# Patient Record
Sex: FEMALE | Race: OTHER | HISPANIC OR LATINO | URBAN - METROPOLITAN AREA
[De-identification: names, ages, dates, MRNs, and addresses within clinical notes are randomized per-mention and may not be internally consistent; named-entity substitution may affect disease eponyms.]

---

## 2018-06-07 ENCOUNTER — OFFICE VISIT (OUTPATIENT)
Dept: OBGYN CLINIC | Facility: CLINIC | Age: 23
End: 2018-06-07
Payer: COMMERCIAL

## 2018-06-07 VITALS
HEIGHT: 63 IN | SYSTOLIC BLOOD PRESSURE: 120 MMHG | BODY MASS INDEX: 29.77 KG/M2 | DIASTOLIC BLOOD PRESSURE: 78 MMHG | WEIGHT: 168 LBS

## 2018-06-07 DIAGNOSIS — N92.6 IRREGULAR BLEEDING: Primary | ICD-10-CM

## 2018-06-07 DIAGNOSIS — N93.0 POSTCOITAL BLEEDING: ICD-10-CM

## 2018-06-07 PROCEDURE — 99202 OFFICE O/P NEW SF 15 MIN: CPT | Performed by: PHYSICIAN ASSISTANT

## 2018-06-07 PROCEDURE — 87591 N.GONORRHOEAE DNA AMP PROB: CPT | Performed by: PHYSICIAN ASSISTANT

## 2018-06-07 PROCEDURE — G0145 SCR C/V CYTO,THINLAYER,RESCR: HCPCS | Performed by: PHYSICIAN ASSISTANT

## 2018-06-07 PROCEDURE — 87491 CHLMYD TRACH DNA AMP PROBE: CPT | Performed by: PHYSICIAN ASSISTANT

## 2018-06-07 RX ORDER — NORGESTREL-ETHINYL ESTRADIOL 0.3-0.03MG
TABLET ORAL
COMMUNITY
Start: 2018-04-02 | End: 2018-06-08 | Stop reason: SDUPTHER

## 2018-06-07 NOTE — PROGRESS NOTES
Lomarizol Freitas  1995    S:  25 y o  female here for a problem visit  She reports irregular menses  She says that in the past she had a very heavy, long bleed requiring a D&C  At that time she was placed on continuous Cryselle for control of heavy bleeding  She has done well until the past few months and she is now having irregular bleeding again  It can range from spotting to heavy bleeding  She is taking her pills every day at the same time  She also notes vaginal bleeding with intercourse or masturbation, only with an orgasm, and with or without penetration  She has no pain with this  This has occurred over the past year  She is not sexually active for the past year  She recalls being tested when she was young because she had excessive bleeding and had trouble stopping bleeding if she had a cut, etc , and does not recall any diagnosis being reached  Her mother had a hysterectomy for menorrhagia in her 35s      Past Medical History:   Diagnosis Date    Depression     Migraine     Sleep difficulties      Family History   Problem Relation Age of Onset    Depression Mother    Vena Demarco Fibromyalgia Mother     Arthritis Mother     Diabetes Father     Autoimmune disease Brother     Prostate cancer Maternal Grandfather     Diabetes Paternal Grandmother     Diabetes Paternal Grandfather     Skin cancer Paternal Grandfather      Social History     Social History    Marital status: Single     Spouse name: N/A    Number of children: N/A    Years of education: N/A     Social History Main Topics    Smoking status: Never Smoker    Smokeless tobacco: Never Used    Alcohol use Yes      Comment: socially     Drug use: No    Sexual activity: Not Currently     Partners: Male     Birth control/ protection: OCP     Other Topics Concern    None     Social History Narrative    None       O:  /78 (BP Location: Right arm, Patient Position: Sitting, Cuff Size: Standard)   Ht 5' 2 99" (1 6 m)   Wt 76 2 kg (168 lb)   LMP  (LMP Unknown)   BMI 29 77 kg/m²   She appears well and is in no distress  Abdomen is soft and nontender  External genitals are normal without lesions or rashes  Vagina is normal, no discharge or bleeding noted  Cervix is normal, no lesions or discharge, not friable  Uterus is nontender, no masses  Adnexa are nontender, no pelvic masses appreciated    A/P:  Irregular bleeding  Postcoital bleeding - question if this is truly postcoital or if it is bleeding due to her irregular bleeding and having an orgasm    Will check Pap, GC/chlamydia, CBC, TSH, vonWillebrand profile  Patient considering switch to [facility] or [facility]; she was sent with information regarding both of these

## 2018-06-08 DIAGNOSIS — Z01.419 ENCOUNTER FOR GYNECOLOGICAL EXAMINATION WITHOUT ABNORMAL FINDING: Primary | ICD-10-CM

## 2018-06-08 LAB
CHLAMYDIA DNA CVX QL NAA+PROBE: NORMAL
N GONORRHOEA DNA GENITAL QL NAA+PROBE: NORMAL

## 2018-06-08 RX ORDER — NORGESTREL-ETHINYL ESTRADIOL 0.3-0.03MG
1 TABLET ORAL DAILY
Qty: 28 TABLET | Refills: 0 | Status: SHIPPED | OUTPATIENT
Start: 2018-06-08 | End: 2018-06-28 | Stop reason: SDUPTHER

## 2018-06-08 NOTE — TELEPHONE ENCOUNTER
Pt was in yesterday with Irving Duncan, needs her [facility] [place]   Pt said just 1 pack

## 2018-06-12 ENCOUNTER — TELEPHONE (OUTPATIENT)
Dept: OBGYN CLINIC | Facility: CLINIC | Age: 23
End: 2018-06-12

## 2018-06-12 LAB
LAB AP GYN PRIMARY INTERPRETATION: NORMAL
Lab: NORMAL

## 2018-06-12 NOTE — TELEPHONE ENCOUNTER
Mirena paperwork faxed to [facility] on 6/12/2018, please check Mirena status in one week  Thank you

## 2018-06-14 ENCOUNTER — TELEPHONE (OUTPATIENT)
Dept: OBGYN CLINIC | Facility: CLINIC | Age: 23
End: 2018-06-14

## 2018-06-14 LAB
APTT PPP: 32 SEC (ref 22–34)
BASOPHILS # BLD AUTO: 50 CELLS/UL (ref 0–200)
BASOPHILS NFR BLD AUTO: 0.6 %
COAG FACT INTRINSIC PPP-IMP: NORMAL
EOSINOPHIL # BLD AUTO: 58 CELLS/UL (ref 15–500)
EOSINOPHIL NFR BLD AUTO: 0.7 %
ERYTHROCYTE [DISTWIDTH] IN BLOOD BY AUTOMATED COUNT: 13.4 % (ref 11–15)
FACT XIIIA PPP-ACNC: 91 % (ref 50–180)
HCT VFR BLD AUTO: 38.8 % (ref 35–45)
HGB BLD-MCNC: 12.9 G/DL (ref 11.7–15.5)
LYMPHOCYTES # BLD AUTO: 2498 CELLS/UL (ref 850–3900)
LYMPHOCYTES NFR BLD AUTO: 30.1 %
MCH RBC QN AUTO: 28.7 PG (ref 27–33)
MCHC RBC AUTO-ENTMCNC: 33.2 G/DL (ref 32–36)
MCV RBC AUTO: 86.2 FL (ref 80–100)
MONOCYTES # BLD AUTO: 490 CELLS/UL (ref 200–950)
MONOCYTES NFR BLD AUTO: 5.9 %
NEUTROPHILS # BLD AUTO: 5204 CELLS/UL (ref 1500–7800)
NEUTROPHILS NFR BLD AUTO: 62.7 %
PLATELET # BLD AUTO: 401 THOUSAND/UL (ref 140–400)
PMV BLD REES-ECKER: 9.8 FL (ref 7.5–12.5)
RBC # BLD AUTO: 4.5 MILLION/UL (ref 3.8–5.1)
TSH SERPL-ACNC: 1.24 MIU/L
VWF AG ACT/NOR PPP IA: 80 % (ref 50–217)
VWF MULTIMERS PPP QL: NORMAL
VWF:RCO ACT/NOR PPP PL AGG: 56 % (ref 42–200)
WBC # BLD AUTO: 8.3 THOUSAND/UL (ref 3.8–10.8)

## 2018-06-14 NOTE — TELEPHONE ENCOUNTER
----- Message from Marie Ladd PA-C sent at 6/14/2018  3:16 PM EDT -----  Please let Khushi Hammond know that all of her blood tests are normal - great news!

## 2018-06-14 NOTE — TELEPHONE ENCOUNTER
I spent a long time on phone with pt  She discussed her irreg bleeding and how it is affecting her emotionally  Pt wants [facility] to call her to discuss  I tried to tell pt - mirena will take about 1 mo to get and insert - pt wants to speak with [facility] now  Please call her     I originally called her to tell her labs ok

## 2018-06-15 NOTE — TELEPHONE ENCOUNTER
Spoke with pt - she is aware of the process and where we are with it - she stated she also spoke with [facility] - is ok for now

## 2018-06-15 NOTE — TELEPHONE ENCOUNTER
Patient stated she handed the IUD paperwork into the [place] office on Monday  Exaplained the process of how long it would be for us to get the IUD  Still would like to have [facility] to call her to discuss her bleeding

## 2018-06-15 NOTE — TELEPHONE ENCOUNTER
I left her a message that all her results are normal and that if she is interested in an IUD to get the paperwork back to us so that we can order it for her

## 2018-06-15 NOTE — TELEPHONE ENCOUNTER
I called her again and left a message telling her she should let the nurses know what questions she has so that I can help her, since I am with patients

## 2018-06-21 NOTE — TELEPHONE ENCOUNTER
Called [facility] to check iud status, per Thais Campos @ [facility] will arrive to our office on 6/26/2018

## 2018-06-28 DIAGNOSIS — IMO0001 BIRTH CONTROL: Primary | ICD-10-CM

## 2018-06-28 DIAGNOSIS — Z01.419 ENCOUNTER FOR GYNECOLOGICAL EXAMINATION WITHOUT ABNORMAL FINDING: ICD-10-CM

## 2018-06-28 RX ORDER — NORGESTREL-ETHINYL ESTRADIOL 0.3-0.03MG
1 TABLET ORAL DAILY
Qty: 28 TABLET | Refills: 0 | Status: SHIPPED | OUTPATIENT
Start: 2018-06-28 | End: 2018-07-03 | Stop reason: ALTCHOICE

## 2018-06-28 NOTE — TELEPHONE ENCOUNTER
Pt needs refill of bc cryselle, until she makes her appt for Mirena insertion, told pt mirena is here & she will call when she gets her cycle,  [facility]s [facility] [place] [facility],  prov is [facility],  thanks

## 2018-06-28 NOTE — TELEPHONE ENCOUNTER
Mirena received from [facility]  Will be placed in the [facility] room pending insertion  Thank you!

## 2018-07-03 ENCOUNTER — OFFICE VISIT (OUTPATIENT)
Dept: OBGYN CLINIC | Facility: CLINIC | Age: 23
End: 2018-07-03
Payer: COMMERCIAL

## 2018-07-03 VITALS — SYSTOLIC BLOOD PRESSURE: 124 MMHG | DIASTOLIC BLOOD PRESSURE: 78 MMHG | WEIGHT: 168 LBS | BODY MASS INDEX: 29.77 KG/M2

## 2018-07-03 DIAGNOSIS — Z30.430 ENCOUNTER FOR INSERTION OF MIRENA IUD: Primary | ICD-10-CM

## 2018-07-03 PROCEDURE — 58300 INSERT INTRAUTERINE DEVICE: CPT | Performed by: PHYSICIAN ASSISTANT

## 2018-08-09 ENCOUNTER — ANNUAL EXAM (OUTPATIENT)
Dept: OBGYN CLINIC | Facility: CLINIC | Age: 23
End: 2018-08-09
Payer: COMMERCIAL

## 2018-08-09 VITALS
DIASTOLIC BLOOD PRESSURE: 58 MMHG | WEIGHT: 171.4 LBS | BODY MASS INDEX: 30.37 KG/M2 | SYSTOLIC BLOOD PRESSURE: 106 MMHG | HEIGHT: 63 IN

## 2018-08-09 DIAGNOSIS — Z11.3 SCREENING FOR STDS (SEXUALLY TRANSMITTED DISEASES): Primary | ICD-10-CM

## 2018-08-09 PROCEDURE — 99395 PREV VISIT EST AGE 18-39: CPT | Performed by: PHYSICIAN ASSISTANT

## 2018-08-09 PROCEDURE — 87491 CHLMYD TRACH DNA AMP PROBE: CPT | Performed by: PHYSICIAN ASSISTANT

## 2018-08-09 PROCEDURE — 87591 N.GONORRHOEAE DNA AMP PROB: CPT | Performed by: PHYSICIAN ASSISTANT

## 2018-08-09 NOTE — PROGRESS NOTES
Richard Cowan  1995    CC:  Yearly exam    S:  25 y o  female here for yearly exam  Her cycles are regular, not heavy or crampy  She is sexually active with a newer partner  She uses Mirena for contraception; this was placed on 7/3  She has been amenorrheic since and she is very happy with this  She does request STD testing today  Last Pap 2018 neg      Current Outpatient Prescriptions:     MIRENA, 52 MG, 20 MCG/24HR IUD, , Disp: , Rfl:     sertraline (ZOLOFT) 50 mg tablet, , Disp: , Rfl:   Social History     Social History    Marital status: Single     Spouse name: N/A    Number of children: N/A    Years of education: N/A     Occupational History    Not on file  Social History Main Topics    Smoking status: Never Smoker    Smokeless tobacco: Never Used    Alcohol use Yes      Comment: socially     Drug use: No    Sexual activity: Yes     Partners: Male     Birth control/ protection: IUD     Other Topics Concern    Not on file     Social History Narrative    Mirena IUD inserted 07/03/2018     Family History   Problem Relation Age of Onset    Depression Mother     Fibromyalgia Mother     Arthritis Mother     Diabetes Father     Autoimmune disease Brother     Prostate cancer Maternal Grandfather     Diabetes Paternal Grandmother     Diabetes Paternal Grandfather     Skin cancer Paternal Grandfather      Past Medical History:   Diagnosis Date    Depression     Migraine     Sleep difficulties          O:  Blood pressure 106/58, height 5' 3" (1 6 m), weight 77 7 kg (171 lb 6 4 oz)  Patient appears well and is not in distress  Neck is supple without masses  Breasts are symmetrical without mass, tenderness, nipple discharge, skin changes or adenopathy  Abdomen is soft and nontender without masses  External genitals are normal without lesions or rashes  Vagina is normal without discharge or bleeding  Cervix is normal without discharge or lesion     Uterus is normal, mobile, nontender without palpable mass  Adnexa are normal, nontender, without palpable mass  A:  Yearly exam      P:   GC/chlamydia sent   Pap 2021   RTO one year for yearly exam or sooner as needed

## 2018-08-12 LAB
CHLAMYDIA DNA CVX QL NAA+PROBE: NORMAL
N GONORRHOEA DNA GENITAL QL NAA+PROBE: NORMAL

## 2018-08-29 ENCOUNTER — TELEPHONE (OUTPATIENT)
Dept: OBGYN CLINIC | Facility: CLINIC | Age: 23
End: 2018-08-29

## 2018-08-29 NOTE — TELEPHONE ENCOUNTER
Pt was at urgent care Mon - said she had yeast  Gave her 2 fluconazole  Called same urgent care today - uti sx - they gave her bactrim  Pt wanted to know if ABIC would affect iud   I told her no problem taking bactrim with iud  They did not do nor order u/a

## 2018-08-31 ENCOUNTER — TELEPHONE (OUTPATIENT)
Dept: OBGYN CLINIC | Facility: CLINIC | Age: 23
End: 2018-08-31

## 2018-08-31 ENCOUNTER — APPOINTMENT (OUTPATIENT)
Dept: LAB | Age: 23
End: 2018-08-31
Payer: COMMERCIAL

## 2018-08-31 ENCOUNTER — TRANSCRIBE ORDERS (OUTPATIENT)
Dept: ADMINISTRATIVE | Age: 23
End: 2018-08-31

## 2018-08-31 DIAGNOSIS — N39.0 URINARY TRACT INFECTION WITHOUT HEMATURIA, SITE UNSPECIFIED: ICD-10-CM

## 2018-08-31 DIAGNOSIS — N39.0 URINARY TRACT INFECTION WITHOUT HEMATURIA, SITE UNSPECIFIED: Primary | ICD-10-CM

## 2018-08-31 LAB
BACTERIA UR QL AUTO: ABNORMAL /HPF
BILIRUB UR QL STRIP: NEGATIVE
CLARITY UR: ABNORMAL
COLOR UR: YELLOW
GLUCOSE UR STRIP-MCNC: NEGATIVE MG/DL
HGB UR QL STRIP.AUTO: NEGATIVE
HYALINE CASTS #/AREA URNS LPF: ABNORMAL /LPF
KETONES UR STRIP-MCNC: NEGATIVE MG/DL
LEUKOCYTE ESTERASE UR QL STRIP: ABNORMAL
NITRITE UR QL STRIP: NEGATIVE
NON-SQ EPI CELLS URNS QL MICRO: ABNORMAL /HPF
PH UR STRIP.AUTO: 6.5 [PH] (ref 4.5–8)
PROT UR STRIP-MCNC: NEGATIVE MG/DL
RBC #/AREA URNS AUTO: ABNORMAL /HPF
SP GR UR STRIP.AUTO: 1.01 (ref 1–1.03)
UROBILINOGEN UR QL STRIP.AUTO: 0.2 E.U./DL
WBC #/AREA URNS AUTO: ABNORMAL /HPF

## 2018-08-31 PROCEDURE — 81001 URINALYSIS AUTO W/SCOPE: CPT | Performed by: PHYSICIAN ASSISTANT

## 2018-08-31 PROCEDURE — 87086 URINE CULTURE/COLONY COUNT: CPT

## 2018-08-31 NOTE — TELEPHONE ENCOUNTER
Patient reports having vaginal itching and trouble urinating  Patient was seen at the Urgent care twice  The first time they gave her diflucan for a suspected yeast infection but that did not work  The second time they gave her bactrim for an UTI but that did not relieve her symptoms either  Patient is still in pain but we did not have an appointment available until next week  Please advise

## 2018-08-31 NOTE — TELEPHONE ENCOUNTER
I spoke with patient, she states she has burning and irritation when she urinates, she has been on Bactrim for 3 days given to her by the urgent care  She is not better so she went back to the urgent care today and they gave her a different antibiotic which she did not know the name of, it was in her car  She was a the lab giving her urine specimen  I told her to follow the instructions given to her by urgent care and to call if not better

## 2018-09-01 LAB — BACTERIA UR CULT: NORMAL

## 2018-09-04 ENCOUNTER — TELEPHONE (OUTPATIENT)
Dept: OBGYN CLINIC | Facility: CLINIC | Age: 23
End: 2018-09-04

## 2018-09-04 NOTE — TELEPHONE ENCOUNTER
Patient states she went to urgent care, treated for yeast infection with fluconazole, did not help  Then treated for UTI with Bactrim and cipro, did not work  Her symptoms are internal vaginal itching and burning  No discharge, slight foul odor  She was called by the urgent care and told she did not have a UTI  Please advise if she needs an appointment   She is aware that this will not be addressed until tomorrow

## 2018-09-06 ENCOUNTER — OFFICE VISIT (OUTPATIENT)
Dept: OBGYN CLINIC | Facility: CLINIC | Age: 23
End: 2018-09-06
Payer: COMMERCIAL

## 2018-09-06 VITALS — WEIGHT: 178 LBS | BODY MASS INDEX: 31.53 KG/M2 | DIASTOLIC BLOOD PRESSURE: 64 MMHG | SYSTOLIC BLOOD PRESSURE: 102 MMHG

## 2018-09-06 DIAGNOSIS — N89.8 VAGINAL IRRITATION: ICD-10-CM

## 2018-09-06 DIAGNOSIS — R10.2 PELVIC PAIN: Primary | ICD-10-CM

## 2018-09-06 PROCEDURE — 87510 GARDNER VAG DNA DIR PROBE: CPT | Performed by: PHYSICIAN ASSISTANT

## 2018-09-06 PROCEDURE — 87660 TRICHOMONAS VAGIN DIR PROBE: CPT | Performed by: PHYSICIAN ASSISTANT

## 2018-09-06 PROCEDURE — 99214 OFFICE O/P EST MOD 30 MIN: CPT | Performed by: PHYSICIAN ASSISTANT

## 2018-09-06 PROCEDURE — 87480 CANDIDA DNA DIR PROBE: CPT | Performed by: PHYSICIAN ASSISTANT

## 2018-09-06 RX ORDER — METRONIDAZOLE 500 MG/1
500 TABLET ORAL EVERY 8 HOURS SCHEDULED
Qty: 42 TABLET | Refills: 0 | Status: SHIPPED | OUTPATIENT
Start: 2018-09-06 | End: 2018-09-21 | Stop reason: ALTCHOICE

## 2018-09-06 RX ORDER — DOXYCYCLINE 100 MG/1
100 TABLET ORAL 2 TIMES DAILY
Qty: 28 TABLET | Refills: 0 | Status: SHIPPED | OUTPATIENT
Start: 2018-09-06 | End: 2018-09-20

## 2018-09-08 LAB
CANDIDA RRNA VAG QL PROBE: NEGATIVE
G VAGINALIS RRNA GENITAL QL PROBE: NEGATIVE
T VAGINALIS RRNA GENITAL QL PROBE: NEGATIVE

## 2018-09-14 ENCOUNTER — TELEPHONE (OUTPATIENT)
Dept: OBGYN CLINIC | Facility: CLINIC | Age: 23
End: 2018-09-14

## 2018-09-14 NOTE — TELEPHONE ENCOUNTER
Pt started Doxy and Flagyl on Sunday, she feels nauseous on it  She feels a little better with her vaginal itching but it does get worse at times  Please advise and [facility] [place]

## 2018-09-14 NOTE — TELEPHONE ENCOUNTER
Left voicemail message today @ [phone] per Pt's signed communication consent form in EHR  Pt informed, via voicemail message, that her reported symptoms were addressed with GERDA Kent  Pt informed that "nausea" is a side effect of both prescribed medications (Doxy & Flagyl)  Pt advised that she should try taking said medications separately from each other and with food, if that doesn't work, take only Doxycycline medication and stop taking Flagyl medication per Burton Pepper' recommendation  Reiterated to Pt, via vm message, that if she has any questions/concerns, to contact office

## 2018-09-14 NOTE — TELEPHONE ENCOUNTER
Nausea is a side effect of these meds  Try taking them separately from each other and with food  If that doesn't work, take only the doxy and stop the Flagyl

## 2018-09-14 NOTE — TELEPHONE ENCOUNTER
Spoke with Pt today via phone call  Pt encouraged to completely finish medication, should see "flare ups" start to subside  Reiterated to Pt that if "flare ups" continue/symptoms worsen to contact office

## 2018-09-14 NOTE — TELEPHONE ENCOUNTER
Dear Vivien Hamman:    Pt called office today c/o feeling extremely nauseous after taking recently prescribed medication for suspect contact allergic reaction and pelvic pain  Pt was prescribed Doxycycline 100 mg PO BID x 14 days and Flagyl 500 mg PO BID x 14 days on 9/6/18  Pt states she has not drank any alcoholic beverages while taking Flagyl  Pt further states that 3 days ago she had a "flare up" of her vaginal symptoms (itching & burning)  Pt states she is allergic to penicillin and it's derivatives  Pt further states she also takes Zoloft for depression  Please advise  Thank you!     Chrissy Kumar MA

## 2018-09-20 ENCOUNTER — TELEPHONE (OUTPATIENT)
Dept: OBGYN CLINIC | Facility: CLINIC | Age: 23
End: 2018-09-20

## 2018-09-20 NOTE — TELEPHONE ENCOUNTER
Pt s cultures were neg  She said she did not start meds till 9/9 as she had a party 9/8  She wants to know if she can stop them as they make her sick to stomach  On Flagyl and Doxy  I think her body aches are viral but stomach issues and dry mouth are med side effects    Thank you

## 2018-09-20 NOTE — TELEPHONE ENCOUNTER
Patient states she recently started taking the medications FLAGYL and ADOXA and now the patient complains of having dry mouth, sore throat and body aches  Patient thinks the medications are causing these side affects  Would like to speak to a nurse

## 2018-09-20 NOTE — TELEPHONE ENCOUNTER
If she wants to stop the antibiotics, she can, but I'm not sure she has taken them long enough to even have any effect on what she came here for       If her problems are recurrent she will need to come back for a visit

## 2018-09-21 ENCOUNTER — OFFICE VISIT (OUTPATIENT)
Dept: OBGYN CLINIC | Facility: CLINIC | Age: 23
End: 2018-09-21
Payer: COMMERCIAL

## 2018-09-21 VITALS — WEIGHT: 173 LBS | SYSTOLIC BLOOD PRESSURE: 118 MMHG | BODY MASS INDEX: 30.65 KG/M2 | DIASTOLIC BLOOD PRESSURE: 74 MMHG

## 2018-09-21 DIAGNOSIS — N94.9 VAGINAL BURNING: Primary | ICD-10-CM

## 2018-09-21 PROCEDURE — 87510 GARDNER VAG DNA DIR PROBE: CPT | Performed by: PHYSICIAN ASSISTANT

## 2018-09-21 PROCEDURE — 87660 TRICHOMONAS VAGIN DIR PROBE: CPT | Performed by: PHYSICIAN ASSISTANT

## 2018-09-21 PROCEDURE — 87480 CANDIDA DNA DIR PROBE: CPT | Performed by: PHYSICIAN ASSISTANT

## 2018-09-21 PROCEDURE — 99213 OFFICE O/P EST LOW 20 MIN: CPT | Performed by: PHYSICIAN ASSISTANT

## 2018-09-21 NOTE — PROGRESS NOTES
[facility]ennox [facility]  1995    S:  25 y o  female here for two week follow-up  She reports no improvement in her vaginal itching and burning since seen last   She took her doxycycline and Flagyl but had severe GI upset so she stopped her Flagyl yesterday and took her last doxycycline today  She still complains of vaginal burning and itching  Past Medical History:   Diagnosis Date    Depression     Migraine     Sleep difficulties      Family History   Problem Relation Age of Onset    Depression Mother    Levora Quintanilla Fibromyalgia Mother     Arthritis Mother     Diabetes Father     Autoimmune disease Brother     Prostate cancer Maternal Grandfather     Diabetes Paternal Grandmother     Diabetes Paternal Grandfather     Skin cancer Paternal Grandfather      Social History     Social History    Marital status: Single     Spouse name: N/A    Number of children: N/A    Years of education: N/A     Social History Main Topics    Smoking status: Never Smoker    Smokeless tobacco: Never Used    Alcohol use Yes      Comment: socially     Drug use: No    Sexual activity: Yes     Partners: Male     Birth control/ protection: IUD     Other Topics Concern    None     Social History Narrative    Mirena IUD inserted 07/03/2018       O:  /74 (BP Location: Right arm, Patient Position: Sitting, Cuff Size: Standard)   Wt 78 5 kg (173 lb)   LMP  (LMP Unknown)   BMI 30 65 kg/m²   She appears well and is in no distress  Abdomen is soft and nontender  External genitals are normal without lesions or rashes  Vagina has scant white discharge  Cervix is normal, no lesions or discharge  Uterus is nontender, no masses  Adnexa are nontender, no pelvic masses appreciated    Wet mount reveals no yeast, no clue cells, no trich, pH 4 0 neg whiff    A/P:  Vaginitis  Await affirm  Treat as indicated  She admits anxiety may be playing a significant role in her vaginal symptoms as she is very stressed lately

## 2018-09-22 LAB
CANDIDA RRNA VAG QL PROBE: POSITIVE
G VAGINALIS RRNA GENITAL QL PROBE: NEGATIVE
T VAGINALIS RRNA GENITAL QL PROBE: NEGATIVE

## 2018-09-24 ENCOUNTER — TELEPHONE (OUTPATIENT)
Dept: OBGYN CLINIC | Facility: CLINIC | Age: 23
End: 2018-09-24

## 2018-09-24 DIAGNOSIS — B37.9 YEAST INFECTION: Primary | ICD-10-CM

## 2018-09-24 RX ORDER — FLUCONAZOLE 150 MG/1
TABLET ORAL
Qty: 2 TABLET | Refills: 0 | Status: SHIPPED | OUTPATIENT
Start: 2018-09-24 | End: 2018-09-27

## 2018-09-24 NOTE — TELEPHONE ENCOUNTER
----- Message from Radha Capps PA-C sent at 9/24/2018  8:18 AM EDT -----  Please let Prosper Cat know that her vaginal cultures now show yeast  Please treat with Diflucan 150mg po x one dose, repeat in 3 days Thx

## 2018-09-24 NOTE — TELEPHONE ENCOUNTER
Patient returned call - she is aware fo culture results and that Diflucan is being prescribed to her  Gave directions on use and cautions  Rx in Epic - please sign  Thanks!

## 2018-10-18 ENCOUNTER — TELEPHONE (OUTPATIENT)
Dept: OBGYN CLINIC | Facility: CLINIC | Age: 23
End: 2018-10-18

## 2018-10-18 NOTE — TELEPHONE ENCOUNTER
Pt last seen 9/2018 wit [facility],  Pt is having a foul odor, extreme vaginal burning, not sexual active,  pls call pt,   thanks

## 2018-10-18 NOTE — TELEPHONE ENCOUNTER
Spoke with pt - Patient was first seen 09/06 for vaginal burning and pelvic pain - affirm done - neg - given doxycycline and flagyl  She stopped taking the flagyl due to GI upset  Seen again 09/21 - another affirm done - positive for yeast - given Diflucan  Patient states she still has the vaginal burning, but now a strong fishy odor and off-white sticky discharge  No itching  No recent intercourse  Has been using unscented dove soap  Patient is worried  Please advise  Thanks!

## 2018-10-19 ENCOUNTER — OFFICE VISIT (OUTPATIENT)
Dept: OBGYN CLINIC | Facility: CLINIC | Age: 23
End: 2018-10-19
Payer: COMMERCIAL

## 2018-10-19 VITALS — DIASTOLIC BLOOD PRESSURE: 80 MMHG | WEIGHT: 176 LBS | SYSTOLIC BLOOD PRESSURE: 124 MMHG | BODY MASS INDEX: 31.18 KG/M2

## 2018-10-19 DIAGNOSIS — R35.0 URINARY FREQUENCY: ICD-10-CM

## 2018-10-19 DIAGNOSIS — N89.8 VAGINAL DISCHARGE: Primary | ICD-10-CM

## 2018-10-19 PROCEDURE — 87086 URINE CULTURE/COLONY COUNT: CPT | Performed by: PHYSICIAN ASSISTANT

## 2018-10-19 PROCEDURE — 99213 OFFICE O/P EST LOW 20 MIN: CPT | Performed by: PHYSICIAN ASSISTANT

## 2018-10-19 RX ORDER — VENLAFAXINE HYDROCHLORIDE 37.5 MG/1
CAPSULE, EXTENDED RELEASE ORAL
Refills: 0 | COMMUNITY
Start: 2018-09-20

## 2018-10-19 RX ORDER — CLINDAMYCIN PHOSPHATE 20 MG/G
1 CREAM VAGINAL
Qty: 40 G | Refills: 0 | Status: SHIPPED | OUTPATIENT
Start: 2018-10-19

## 2018-10-19 NOTE — PROGRESS NOTES
Meryl Crowjossie  1995    S:  25 y o  female here for a problem visit  She notes a week of white to yellow vaginal discharge and severe burning  She thinks maybe there is an odor, but also thinks maybe she is over-thinking things  She has not been sexually active since last seen  She has changed to [facility] Sensitive skin soap in the vulvar area  She was treated for a yeast infection with two Diflucan tablets about 3 weeks ago and those symptoms completely resolved  Her current symptoms feel very different than that  Past Medical History:   Diagnosis Date    Depression     Migraine     Sleep difficulties      Family History   Problem Relation Age of Onset    Depression Mother    Sonido Daubs Fibromyalgia Mother     Arthritis Mother     Diabetes Father     Autoimmune disease Brother     Prostate cancer Maternal Grandfather     Diabetes Paternal Grandmother     Diabetes Paternal Grandfather     Skin cancer Paternal Grandfather      Social History     Social History    Marital status: Single     Spouse name: N/A    Number of children: N/A    Years of education: N/A     Social History Main Topics    Smoking status: Never Smoker    Smokeless tobacco: Never Used    Alcohol use Yes      Comment: socially     Drug use: No    Sexual activity: Yes     Partners: Male     Birth control/ protection: IUD     Other Topics Concern    None     Social History Narrative    Mirena IUD inserted 07/03/2018       O:  /80 (BP Location: Right arm, Patient Position: Sitting, Cuff Size: Standard)   Wt 79 8 kg (176 lb)   LMP  (LMP Unknown)   BMI 31 18 kg/m²   She appears well and is in no distress  Abdomen is soft and nontender  External genitals are normal without lesions or rashes  Vagina has a small amount of mucusy yellow discharge  Cervix is normal, no lesions or discharge   IUD strings are normal      Wet mount reveals no yeast, no clue cells, no trich, pH 4 0, neg whiff but many WBCs    A/P:  Vaginal discharge  Will use clindamycin cream QHS x 7    If no better one week after she stops her medication, may need to consider her IUD as a culprit for her recurrent discharge and burning

## 2018-10-20 LAB — BACTERIA UR CULT: NORMAL

## 2018-10-22 ENCOUNTER — TELEPHONE (OUTPATIENT)
Dept: OBGYN CLINIC | Facility: CLINIC | Age: 23
End: 2018-10-22

## 2018-10-22 NOTE — TELEPHONE ENCOUNTER
----- Message from Jean Pierre Taylor PA-C sent at 10/22/2018  7:08 AM EDT -----  Please let Ellie Brink know that her urine culture is negative   Thx

## 2021-08-13 ENCOUNTER — TELEPHONE (OUTPATIENT)
Dept: PSYCHIATRY | Facility: CLINIC | Age: 26
End: 2021-08-13

## 2021-10-21 ENCOUNTER — OFFICE VISIT (OUTPATIENT)
Dept: DERMATOLOGY | Facility: CLINIC | Age: 26
End: 2021-10-21
Payer: COMMERCIAL

## 2021-10-21 VITALS — HEIGHT: 63 IN | WEIGHT: 193 LBS | TEMPERATURE: 97.8 F | BODY MASS INDEX: 34.2 KG/M2

## 2021-10-21 DIAGNOSIS — D48.5 NEOPLASM OF UNCERTAIN BEHAVIOR OF SKIN: ICD-10-CM

## 2021-10-21 DIAGNOSIS — L20.9 ATOPIC DERMATITIS, UNSPECIFIED TYPE: Primary | ICD-10-CM

## 2021-10-21 DIAGNOSIS — L70.0 ACNE VULGARIS: ICD-10-CM

## 2021-10-21 PROCEDURE — 99203 OFFICE O/P NEW LOW 30 MIN: CPT | Performed by: DERMATOLOGY

## 2021-10-21 RX ORDER — TRIAMCINOLONE ACETONIDE 1 MG/G
CREAM TOPICAL 2 TIMES DAILY
Qty: 80 G | Refills: 0 | Status: SHIPPED | OUTPATIENT
Start: 2021-10-21

## 2023-01-11 NOTE — PROGRESS NOTES
Ruba Casey  1995    S:  25 y o  female here for a problem visit  She started with vaginal and vulvar itching and burning and went to urgent care; she was given two Diflucan pills which made no difference  She went back and was treated for a presumed UTI based on complaints of burning with urination - but with further questioning the burning was when urine touched her skin  She was given Bactrim and a urine culture was sent  She then went to her PCP who felt that she also had a UTI and was treated with Cipro, but at that point her urine culture returned negative  She still reports some vaginal and vulvar itching  She denies discharge or odor  She denies dysuria, frequency or urgency  She has not been sexually active recently because she is worried it would hurt - mostly because she had inserted a finger to see what was going on in there and it hurt       She has the same sexual partner since she was last seen and her GC/chlamydia testing was negative in early August      Past Medical History:   Diagnosis Date    Depression     Migraine     Sleep difficulties      Family History   Problem Relation Age of Onset    Depression Mother    [place] Fibromyalgia Mother     Arthritis Mother     Diabetes Father     Autoimmune disease Brother     Prostate cancer Maternal Grandfather     Diabetes Paternal Grandmother     Diabetes Paternal Grandfather     Skin cancer Paternal Grandfather      Social History     Social History    Marital status: Single     Spouse name: N/A    Number of children: N/A    Years of education: N/A     Social History Main Topics    Smoking status: Never Smoker    Smokeless tobacco: Never Used    Alcohol use Yes      Comment: socially     Drug use: No    Sexual activity: Yes     Partners: Male     Birth control/ protection: IUD     Other Topics Concern    None     Social History Narrative    Mirena IUD inserted 07/03/2018       O:  /64 (BP Location: Right arm, Solucortef being weaned. To receive Solucortef 25 mg po bid x two doses then discontinue. Last dose tomorrow morning. If no issues or complications anticipate discharge to home tomorrow. To transition to oral Bumex. Claudetta Hurdle, MD  #[id number]    ADDENDUM:  Patient wants to go home today. Discussed with PharmD and will transition to Prednisone 20 mg daily and decreased every 2 days to 5 mg then discontinue. Home today. Patient Position: Sitting, Cuff Size: Standard)   Wt 80 7 kg (178 lb)   LMP  (LMP Unknown)   BMI 31 53 kg/m²   She appears well and is in no distress  Abdomen is soft and nontender  External genitals are normal without lesions or rashes  Vagina is normal, scant mucusu discharge or bleeding noted  Cervix is normal, no lesions or discharge, mildly tender  Uterus is mildly tender, no masses  Adnexa are mildly tender bilaterally, right more than left, pelvic masses appreciated    Wet mount reveals no yeast, no trich, no clue cells, few WBCs, pH 5 0, neg whiff    A/P:  Vaginal and vulvar itching - suspect contact rxn - discussed products to avoid  Sent Affirm for confirmation  Pelvic pain - will start doxycycline 100mg po BID x 14 days and Flagyl 500mg po BID x 14 days  Recheck here in two weeks  Call sooner with any worsening

## 2023-05-05 ENCOUNTER — PROCEDURE VISIT (OUTPATIENT)
Dept: OBGYN CLINIC | Facility: CLINIC | Age: 28
End: 2023-05-05

## 2023-05-05 VITALS
SYSTOLIC BLOOD PRESSURE: 122 MMHG | WEIGHT: 211 LBS | DIASTOLIC BLOOD PRESSURE: 84 MMHG | HEIGHT: 64 IN | BODY MASS INDEX: 36.02 KG/M2

## 2023-05-05 DIAGNOSIS — Z30.432 ENCOUNTER FOR IUD REMOVAL: Primary | ICD-10-CM

## 2023-05-05 RX ORDER — ONDANSETRON 4 MG/1
4 TABLET, ORALLY DISINTEGRATING ORAL EVERY 8 HOURS
COMMUNITY
Start: 2023-03-17

## 2023-05-05 RX ORDER — OMEPRAZOLE 40 MG/1
40 CAPSULE, DELAYED RELEASE ORAL
COMMUNITY
Start: 2023-03-17

## 2023-05-05 RX ORDER — ARIPIPRAZOLE 2 MG/1
2 TABLET ORAL DAILY
COMMUNITY
Start: 2023-04-05

## 2023-05-05 RX ORDER — VALACYCLOVIR HYDROCHLORIDE 1 G/1
TABLET, FILM COATED ORAL
COMMUNITY
Start: 2023-03-04

## 2023-05-05 RX ORDER — DOXYCYCLINE HYCLATE 100 MG/1
100 CAPSULE ORAL 2 TIMES DAILY
COMMUNITY
Start: 2023-04-10

## 2023-05-05 RX ORDER — FLUVOXAMINE MALEATE 100 MG
TABLET ORAL
COMMUNITY
Start: 2023-04-27

## 2023-05-05 RX ORDER — FLUVOXAMINE MALEATE 50 MG/1
50 TABLET, COATED ORAL EVERY MORNING
COMMUNITY
Start: 2023-04-03

## 2023-05-05 NOTE — PROGRESS NOTES
"Assessment/Plan:     There are no diagnoses linked to this encounter  40-year-old female  Nulliparous  IUD Mirena desire removal  Gastroparesis/IC/  anxiety/depression stable  HSV-2  History of dysfunctional uterine bleeding on OCP  Plan  IUD removed  Patient was interested in trying Halima IUD to return to office for insertion      Subjective:      Patient ID: Delfin Brooks is a 32 y o  female  HPI  31 yo nulliparous female presents to the office today desired IUD removal patient has Mirena IUD inserted 5 years ago  Patient  IUD removal secondary to amenorrhea on IUD as well as patient desired to monitor her depression/anxiety after IUD removal worried about exacerbation of her symptoms in relation to the IUD  Contraception option explained and discussed with patient in details with risk-benefit side effect patient mention she has side effects from OCP in the past and was having heavy bleeding needed to have D&C patient mention she tried different type of OCP  After discussing all management option and oral contraception option patient desired to try Halima IUD desires to have a break for 2 to 3 months and return to office for IUD insertion        \PMH gastoperesis IC anxiety /depresison , OCD, hsv2  PSH d&c    The following portions of the patient's history were reviewed and updated as appropriate: allergies, current medications, past family history, past medical history, past social history, past surgical history and problem list     Review of Systems      Objective:      /84 (BP Location: Left arm, Patient Position: Sitting, Cuff Size: Adult)   Ht 5' 4\" (1 626 m)   Wt 95 7 kg (211 lb)   BMI 36 22 kg/m²          Physical Exam    Iud removal    Date/Time: 5/5/2023 10:15 AM  Performed by: Ratna Johnson MD  Authorized by: Ratna Johnson MD   Universal Protocol:  Consent: Verbal consent obtained    Risks and benefits: risks, benefits and alternatives were discussed  Consent given by: patient  Time " "out: Immediately prior to procedure a \"time out\" was called to verify the correct patient, procedure, equipment, support staff and site/side marked as required    Patient understanding: patient states understanding of the procedure being performed  Patient consent: the patient's understanding of the procedure matches consent given  Procedure consent: procedure consent matches procedure scheduled  Relevant documents: relevant documents present and verified  Patient identity confirmed: verbally with patient      Procedure:     Removed with no complications: yes    Comments:      Speculum apply IUD string seen IUD string grasped with Luz clamp removed and shown to the patient patient tolerated procedure well        "

## 2023-07-20 ENCOUNTER — TELEPHONE (OUTPATIENT)
Dept: NEUROLOGY | Facility: CLINIC | Age: 28
End: 2023-07-20

## 2023-07-20 NOTE — TELEPHONE ENCOUNTER
MARENMM to confirm your upcoming appt, 7/25/23 @8:00am (7:45am) at the [facility], to confirm/RS if you are unable to keep this appt please call [phone]

## 2023-07-25 ENCOUNTER — OFFICE VISIT (OUTPATIENT)
Dept: NEUROLOGY | Facility: CLINIC | Age: 28
End: 2023-07-25
Payer: COMMERCIAL

## 2023-07-25 VITALS
HEIGHT: 64 IN | HEART RATE: 64 BPM | SYSTOLIC BLOOD PRESSURE: 122 MMHG | DIASTOLIC BLOOD PRESSURE: 88 MMHG | BODY MASS INDEX: 36.7 KG/M2 | WEIGHT: 215 LBS

## 2023-07-25 DIAGNOSIS — R43.9 SMELL AND TASTE DISORDER: ICD-10-CM

## 2023-07-25 DIAGNOSIS — R43.2 ABNORMAL SENSE OF TASTE: Primary | ICD-10-CM

## 2023-07-25 DIAGNOSIS — R43.9 SMELL AND TASTE DISORDER: Primary | ICD-10-CM

## 2023-07-25 PROCEDURE — 99204 OFFICE O/P NEW MOD 45 MIN: CPT | Performed by: PSYCHIATRY & NEUROLOGY

## 2023-07-25 NOTE — ASSESSMENT & PLAN NOTE
Present since COVID infection in March 2020, no success with ENT    MRI of brain ordered to confirm no structural concern. Prior MRI was in 2012. Do not need to assess for Sjogren's was ruled out in January    Will get bloodwork for B1, B6, B12, and zinc to also assess for reversible/nutritional causes. These can also provide insight into patients memory concerns.

## 2023-07-25 NOTE — PROGRESS NOTES
Patient ID: Vanita Goltz is a 32 y.o. female. Assessment/Plan:    Smell and taste disorder  Present since COVID infection in March 2020, no success with ENT    MRI of brain ordered to confirm no structural concern. Prior MRI was in 2012. Do not need to assess for Sjogren's was ruled out in January    Will get bloodwork for B1, B6, B12, and zinc to also assess for reversible/nutritional causes. These can also provide insight into patients memory concerns. There are no diagnoses linked to this encounter. Subjective:    Patient is a 71-year-old female with past medical history of anxiety depression and OCD treated with Luvox, bad migraines treated in high school with Topamax which resolved on their own, idiopathic gastroparesis treated with omeprazole, interstitial cystitis. She is establishing care with neurology due to reduced sense of taste and smell. She notes that everything is muted, lessened or is different than how she used to experience it. This all started around the time that she got COVID in March 2020. She denies any issues with taste or smell prior to that. She has been following with ENT and they tried both steroid nasal spray and oral packs without any benefit, so she was recommended to see neurology. Of note patient states that she had a CT that ruled out sinus issues, had a septoplasty for deviated septum in 2021, has never had nasal polyps. No history of trauma to the nose or Lyme disease. She does get some irritation from chemicals and irritants, indicating the trigeminal nerve is not involved. She has some minor environmental allergies and has been diagnosed with eczema in the past but she does not feel this was a correct diagnosis as her skin rashes used to look different from her friends. Patient also notes that she has always had bad memory and issues with recall which may be from medication.   She has always been forgetful but it got a lot worse in the past 4 years.  She gets confused sometimes in conversation and endorses feeling sort of brain fog ever since getting COVID. FHx: dad has memory issues but has not been evaluated/htn/dm, mom has mental health/hld          The following portions of the patient's history were reviewed and updated as appropriate: allergies, current medications, past family history, past medical history, past social history, past surgical history and problem list.         Objective:    Blood pressure 122/88, pulse 64, height 5' 4" (1.626 m), weight 97.5 kg (215 lb). Physical Exam  Constitutional:       General: She is not in acute distress. Appearance: She is obese. HENT:      Head: Normocephalic and atraumatic. Eyes:      General: Lids are normal.      Extraocular Movements: Extraocular movements intact. Pupils: Pupils are equal, round, and reactive to light. Cardiovascular:      Rate and Rhythm: Normal rate and regular rhythm. Heart sounds: Normal heart sounds. Pulmonary:      Effort: Pulmonary effort is normal.      Breath sounds: Normal breath sounds. Neurological:      Mental Status: She is alert. Motor: Motor strength is normal.     Coordination: Coordination is intact. Deep Tendon Reflexes:      Reflex Scores:       Bicep reflexes are 2+ on the right side and 2+ on the left side. Brachioradialis reflexes are 2+ on the right side and 2+ on the left side. Patellar reflexes are 2+ on the right side and 2+ on the left side. Psychiatric:         Mood and Affect: Mood normal.         Speech: Speech normal.         Neurological Exam  Mental Status  Alert. Oriented to person, place, time and situation. Recalls 3 of 3 objects immediately. At 5 minutes recalls 2 of 3 objects. Speech is normal. Able to name objects and repeat. Follows complex commands. Attention and concentration: Can spell world forward and count backwards from 10.  Fund of knowledge is appropriate for level of education. Cranial Nerves  CN III, IV, VI: Extraocular movements intact bilaterally. Normal lids and orbits bilaterally. Pupils equal round and reactive to light bilaterally. CN V: Facial sensation is normal.  CN VII: Full and symmetric facial movement. CN VIII: Hearing is normal.  CN IX, X: Palate elevates symmetrically. Normal gag reflex. CN XI: Shoulder shrug strength is normal.  CN XII: Tongue midline without atrophy or fasciculations. Motor  Normal muscle bulk throughout. Normal muscle tone. Strength is 5/5 throughout all four extremities. Sensory  Normal except more sensitive on RLE? [place] Normal but feels cold more on LLE? [place] Reflexes                                            Right                      Left  Brachioradialis                    2+                         2+  Biceps                                 2+                         2+  Patellar                                2+                         2+    Coordination    Finger-to-nose, rapid alternating movements and heel-to-shin normal bilaterally without dysmetria. ROS:    Review of Systems   Constitutional: Negative for appetite change, fatigue and fever. HENT: Negative. Negative for hearing loss, tinnitus, trouble swallowing and voice change. Loss of taste & smell started 3 years ago   Eyes: Negative. Negative for photophobia, pain and visual disturbance. Respiratory: Negative. Negative for shortness of breath. Cardiovascular: Negative. Negative for palpitations. Gastrointestinal: Negative. Negative for nausea and vomiting. Endocrine: Negative. Negative for cold intolerance. Genitourinary: Negative. Negative for dysuria, frequency and urgency. Musculoskeletal: Negative for back pain, gait problem, myalgias and neck pain. Skin: Negative. Negative for rash. Allergic/Immunologic: Negative. Neurological: Negative.   Negative for dizziness, tremors, seizures, syncope, facial asymmetry, speech difficulty, weakness, light-headedness, numbness and headaches. Hematological: Negative. Does not bruise/bleed easily. Psychiatric/Behavioral: Negative. Negative for confusion, hallucinations and sleep disturbance. Memory issues started about 4 years ago is getting worse   All other systems reviewed and are negative.

## 2023-08-08 ENCOUNTER — TELEPHONE (OUTPATIENT)
Dept: NEUROLOGY | Facility: CLINIC | Age: 28
End: 2023-08-08

## 2023-08-08 NOTE — TELEPHONE ENCOUNTER
----- Message from Tia Lindo MD sent at 7/30/2023  6:36 PM EDT -----  Let pt know vit b12 low at 276. Also vit B6 in low normal range. Rec follow up with pcp for etiology of lower levels and replacement recommendations.

## 2023-08-09 ENCOUNTER — HOSPITAL ENCOUNTER (OUTPATIENT)
Dept: RADIOLOGY | Age: 28
Discharge: HOME | End: 2023-08-09
Payer: COMMERCIAL

## 2023-08-09 DIAGNOSIS — R43.2 ABNORMAL SENSE OF TASTE: ICD-10-CM

## 2023-08-09 DIAGNOSIS — R43.9 SMELL AND TASTE DISORDER: ICD-10-CM

## 2023-08-09 PROCEDURE — A9585 GADOBUTROL INJECTION: HCPCS | Performed by: PSYCHIATRY & NEUROLOGY

## 2023-08-09 PROCEDURE — G1004 CDSM NDSC: HCPCS

## 2023-08-09 PROCEDURE — 70553 MRI BRAIN STEM W/O & W/DYE: CPT

## 2023-08-09 RX ORDER — GADOBUTROL 604.72 MG/ML
10 INJECTION INTRAVENOUS
Status: COMPLETED | OUTPATIENT
Start: 2023-08-09 | End: 2023-08-09

## 2023-08-09 RX ADMIN — GADOBUTROL 10 ML: 604.72 INJECTION INTRAVENOUS at 08:43

## 2023-09-07 ENCOUNTER — TELEPHONE (OUTPATIENT)
Dept: NEUROLOGY | Facility: CLINIC | Age: 28
End: 2023-09-07

## 2023-10-19 ENCOUNTER — PROCEDURE VISIT (OUTPATIENT)
Dept: OBGYN CLINIC | Facility: CLINIC | Age: 28
End: 2023-10-19
Payer: COMMERCIAL

## 2023-10-19 VITALS
DIASTOLIC BLOOD PRESSURE: 78 MMHG | BODY MASS INDEX: 33.46 KG/M2 | SYSTOLIC BLOOD PRESSURE: 126 MMHG | HEIGHT: 64 IN | WEIGHT: 196 LBS

## 2023-10-19 DIAGNOSIS — Z30.430 ENCOUNTER FOR IUD INSERTION: Primary | ICD-10-CM

## 2023-10-19 PROCEDURE — 58300 INSERT INTRAUTERINE DEVICE: CPT | Performed by: OBSTETRICS & GYNECOLOGY

## 2023-10-19 RX ORDER — SERTRALINE HCL 100 MG
TABLET ORAL
COMMUNITY

## 2023-10-19 RX ORDER — SEMAGLUTIDE 2.4 MG/.75ML
INJECTION, SOLUTION SUBCUTANEOUS
COMMUNITY

## 2023-10-19 NOTE — PROGRESS NOTES
Assessment/Plan:     Diagnoses and all orders for this visit:    Encounter for IUD insertion    Other orders  -     Zoloft 100 MG tablet  -     Wegovy 2.4 MG/0.75ML; INJECT 2.4 MG SUBCUTANEOUSLY ONE TIME PER WEEK         44-year-old female  Nulliparous  Gastroparesis/IC/  anxiety/depression stable  HSV-2  History of dysfunctional uterine bleeding on OCP  Desires IUD Kyleena insertion  Plan  IUD inserted  Return to office in 4 weeks for string check       Subjective:      Patient ID: Adrián Bautista is a 32 y.o. female.     HPI  44-year-old female present to the office today for IUD insertion patient interested in [place] IUD risk-benefit side effect reviewed and discussed with patient all patient questions answered and patient was satisfied        The following portions of the patient's history were reviewed and updated as appropriate: allergies, current medications, past family history, past medical history, past social history, past surgical history and problem list.    Review of Systems      Objective:      /78 (BP Location: Left arm, Patient Position: Sitting, Cuff Size: Adult)   Ht 5' 4" (1.626 m)   Wt 88.9 kg (196 lb)   LMP 10/16/2023   BMI 33.64 kg/m²          Physical Exam      Iud insertions    Date/Time: 10/19/2023 10:15 AM    Performed by: Kar Gibson MD  Authorized by: Kar Gibson MD    Verbal consent obtained?: Yes    Risks and benefits: Risks, benefits and alternatives were discussed    Consent given by:  Patient  Time Out:     Time out: Immediately prior to the procedure a time out was called    Patient states understanding of procedure being performed: Yes    Patient's understanding of procedure matches consent: Yes    Procedure consent matches procedure scheduled: Yes    Patient identity confirmed:  Verbally with patient  Procedure:     Pelvic exam performed: yes      Negative urine pregnancy test: yes      Cervix cleaned and prepped: yes      Speculum placed in vagina: yes      [employer] applied to cervix: yes      Uterus sounded: yes      Uterus sound depth (cm):  6.5    IUD inserted with no complications: yes      IUD type:  Toni Mcelroy trimmed: yes    Post-procedure:     Patient tolerated procedure well: yes      Patient will follow up after next period: yes

## 2023-12-22 ENCOUNTER — OFFICE VISIT (OUTPATIENT)
Dept: OBGYN CLINIC | Facility: CLINIC | Age: 28
End: 2023-12-22
Payer: COMMERCIAL

## 2023-12-22 VITALS
HEIGHT: 64 IN | BODY MASS INDEX: 31.76 KG/M2 | DIASTOLIC BLOOD PRESSURE: 62 MMHG | WEIGHT: 186 LBS | SYSTOLIC BLOOD PRESSURE: 128 MMHG

## 2023-12-22 DIAGNOSIS — Z30.431 IUD CHECK UP: Primary | ICD-10-CM

## 2023-12-22 PROCEDURE — 99212 OFFICE O/P EST SF 10 MIN: CPT | Performed by: OBSTETRICS & GYNECOLOGY

## 2023-12-22 NOTE — PROGRESS NOTES
"Assessment/Plan:     Diagnoses and all orders for this visit:    IUD check up        27-year-old female  Nulliparous  Gastroparesis/IC/  anxiety/depression stable  HSV-2  History of dysfunctional uterine bleeding on OCP  IUD Kyleena  Plan  IUD string seen today  Return to office for annual exam    Subjective:      Patient ID: Melva Rocha is a 27 y.o. female.    HPI  Patient seen evaluated present to the office today for IUD check denies any complaint  Complaining of some breakthrough bleeding since insertion denies any heavy bleeding  Denies any pelvic pain  Denies any dyspareunia  Denies any urgency frequency or dysuria  Satisfied overall with the IUD  The following portions of the patient's history were reviewed and updated as appropriate: allergies, current medications, past family history, past medical history, past social history, past surgical history and problem list.    Review of Systems      Objective:      /62 (BP Location: Left arm, Patient Position: Sitting, Cuff Size: Adult)   Ht 5' 4\" (1.626 m)   Wt 84.4 kg (186 lb)   BMI 31.93 kg/m²          Physical Exam  Constitutional:       Appearance: She is well-developed.   Abdominal:      General: There is no distension.      Palpations: Abdomen is soft.      Tenderness: There is no abdominal tenderness.   Genitourinary:     Labia:         Right: No rash, tenderness or lesion.         Left: No rash, tenderness or lesion.       Vagina: No signs of injury. No vaginal discharge, erythema or tenderness.      Cervix: No cervical motion tenderness, discharge or friability.      Adnexa:         Right: No mass, tenderness or fullness.          Left: No mass, tenderness or fullness.           Neurological:      Mental Status: She is alert and oriented to person, place, and time.   Psychiatric:         Behavior: Behavior normal.         "

## 2024-02-22 ENCOUNTER — TELEPHONE (OUTPATIENT)
Dept: SLEEP CENTER | Facility: CLINIC | Age: 29
End: 2024-02-22

## 2024-02-22 NOTE — TELEPHONE ENCOUNTER
Patient left message inquiring if she would need her false nails/nail polish removed for sleep study.    Returned call from patient.  Advised it would be best to arrive without false nails/nail polish if possible, however if nails were just done the technician would be able to work around.  Provided sleep study instructions.  Patient verbalized understanding.

## 2024-02-29 ENCOUNTER — HOSPITAL ENCOUNTER (OUTPATIENT)
Dept: SLEEP CENTER | Facility: CLINIC | Age: 29
Discharge: HOME | End: 2024-02-29
Payer: COMMERCIAL

## 2024-02-29 DIAGNOSIS — G47.419 NARCOLEPSY WITHOUT CATAPLEXY: ICD-10-CM

## 2024-02-29 PROCEDURE — 95810 POLYSOM 6/> YRS 4/> PARAM: CPT

## 2024-03-01 ENCOUNTER — HOSPITAL ENCOUNTER (OUTPATIENT)
Dept: SLEEP CENTER | Facility: CLINIC | Age: 29
Discharge: HOME | End: 2024-03-01
Payer: COMMERCIAL

## 2024-03-01 PROBLEM — G47.419 NARCOLEPSY WITHOUT CATAPLEXY: Status: ACTIVE | Noted: 2024-03-01

## 2024-03-01 PROBLEM — G47.33 OSA (OBSTRUCTIVE SLEEP APNEA): Status: ACTIVE | Noted: 2024-03-01

## 2024-03-01 PROCEDURE — 95805 MULTIPLE SLEEP LATENCY TEST: CPT

## 2024-03-01 NOTE — PROGRESS NOTES
Sleep Study Documentation    Pre-Sleep Study       Sleep testing procedure explained to patient:YES    Patient napped prior to study:NO    Caffeine:Dayshift worker after 12PM.  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     Sleep Study: Diagnostic   Snore:None  Supplemental O2: no    O2 flow rate (L/min) range   O2 flow rate (L/min) final   Minimum SaO2 87%  Baseline SaO2 95.6%    Mode of Therapy:    EKG abnormalities: no     EEG abnormalities: no    Were abnormal behaviors in sleep observed:NO    Is Total Sleep Study Recording Time < 2 hours: N/A    Is Total Sleep Study Recording Time > 2 hours but study is incomplete: N/A    Is Total Sleep Study Recording Time 6 hours or more but sleep was not obtained: NO    Patient classification: employed       Post-Sleep Study    Medication used at bedtime or during sleep study:YES other prescription medications    Patient reports time it took to fall asleep:30 to 60 minutes    Patient reports waking up during study:1 to 2 times.  Patient reports returning to sleep in 10 to 30 minutes.    Patient reports sleeping 4 to 6 hours with dreaming.    Does the Patient feel this is a typical night of sleep:worse than usual    Patient rated sleepiness: Very sleepy or tired    PAP treatment:no.

## 2024-03-01 NOTE — PROGRESS NOTES
Medication Review  The patient was provided a list of their current medications in EPIC.  The patient was asked to review the list and update any changes.    Patient reports: Changes in medication. No      Patient currently using marijuana (medical or recreational): No      Patient currently using nicotine: No      Pre-Sleep Study       Sleep testing procedure explained to patient:YES    Urine drug screen performed: No: Provide Reason No order in Epic    Study Documentation    Patient reports:    Nap: 1: Sleep yes Dream yes    Nap 2:  Sleep yes Dream yes    Nap 3:  Sleep yes Dream yes    Nap 4: Sleep yes Dream no    Nap 5:  Sleep yes Dream yes    EKG abnormalities: no     EEG abnormalities: no    Naps completed: 5

## 2024-03-13 ENCOUNTER — ANNUAL EXAM (OUTPATIENT)
Dept: OBGYN CLINIC | Facility: CLINIC | Age: 29
End: 2024-03-13
Payer: COMMERCIAL

## 2024-03-13 VITALS
WEIGHT: 180.6 LBS | SYSTOLIC BLOOD PRESSURE: 116 MMHG | HEIGHT: 64 IN | DIASTOLIC BLOOD PRESSURE: 70 MMHG | BODY MASS INDEX: 30.83 KG/M2

## 2024-03-13 DIAGNOSIS — Z12.4 SCREENING FOR CERVICAL CANCER: ICD-10-CM

## 2024-03-13 DIAGNOSIS — Z01.419 WOMEN'S ANNUAL ROUTINE GYNECOLOGICAL EXAMINATION: Primary | ICD-10-CM

## 2024-03-13 PROCEDURE — G0145 SCR C/V CYTO,THINLAYER,RESCR: HCPCS | Performed by: OBSTETRICS & GYNECOLOGY

## 2024-03-13 PROCEDURE — S0612 ANNUAL GYNECOLOGICAL EXAMINA: HCPCS | Performed by: OBSTETRICS & GYNECOLOGY

## 2024-03-13 RX ORDER — (CALCIUM, MAGNESIUM, POTASSIUM, AND SODIUM OXYBATES) .5; .5; .5; .5 G/ML; G/ML; G/ML; G/ML
SOLUTION ORAL
COMMUNITY

## 2024-03-13 NOTE — PROGRESS NOTES
"Vicky Rocha is a 28 y.o. female who presents for annual well woman exam. Periods are irregular, lasting a few days. No intermenstrual bleeding, spotting, or discharge.    Current contraception: IUD Kyleena   History of abnormal Pap smear: no  Family history of uterine or ovarian cancer: no  Family history of breast cancer: no    Menstrual History:  OB History          0    Para   0    Term   0       0    AB   0    Living   0         SAB   0    IAB   0    Ectopic   0    Multiple   0    Live Births   0                  No LMP recorded. Patient has had an implant.       The following portions of the patient's history were reviewed and updated as appropriate: allergies, current medications, past family history, past medical history, past social history, past surgical history, and problem list.    Review of Systems  Review of Systems   Constitutional:  Positive for fatigue. Negative for activity change, appetite change, chills and fever.   Respiratory:  Negative for apnea, cough, chest tightness and shortness of breath.    Cardiovascular:  Negative for chest pain, palpitations and leg swelling.   Gastrointestinal:  Negative for abdominal pain, constipation, diarrhea, nausea and vomiting.   Genitourinary:  Positive for frequency and urgency. Negative for difficulty urinating, dysuria, flank pain and hematuria.   Neurological:  Negative for dizziness, seizures, syncope, light-headedness, numbness and headaches.   Psychiatric/Behavioral:  Negative for agitation and confusion.         Decrease libido     Objective      /70 (BP Location: Left arm, Patient Position: Sitting, Cuff Size: Adult)   Ht 5' 4\" (1.626 m)   Wt 81.9 kg (180 lb 9.6 oz)   BMI 31.00 kg/m²     Physical Exam  OBGyn Exam     General:   alert and oriented, in no acute distress, alert, appears stated age, and cooperative   Heart: regular rate and rhythm, S1, S2 normal, no murmur, click, rub or gallop   Lungs: clear to " auscultation bilaterally   Abdomen: soft, non-tender, without masses or organomegaly   Vulva: normal   Vagina: normal mucosa, normal discharge   Cervix: no cervical motion tenderness, no lesions, and iud string seen    Uterus: normal size   Adnexa:  Breast Exam:  normal adnexa  breasts appear normal, no suspicious masses, no skin or nipple changes or axillary nodes.                Assessment      @well woman@ .   28-year-old female  Annual exam   Nulliparous  Gastroparesis/IC/  anxiety/depression stable  HSV-2  History of dysfunctional uterine bleeding on OCP  IUD Kyleena  Plan  Pap/r  Diet/exercise   ca/Vitamin D  May take magnesium at bedtime secondary to problem with falling asleep already seeing a specialist if not helping then she would start medication  Decreased libido management reviewed and discussed with patient lifestyle modification discussed if not helping then we can consider medical management    Maybe relocating to [place]     All questions answered.     There are no Patient Instructions on file for this visit.

## 2024-03-22 LAB
LAB AP GYN PRIMARY INTERPRETATION: NORMAL
Lab: NORMAL